# Patient Record
Sex: FEMALE | Race: BLACK OR AFRICAN AMERICAN | Employment: OTHER | RURAL
[De-identification: names, ages, dates, MRNs, and addresses within clinical notes are randomized per-mention and may not be internally consistent; named-entity substitution may affect disease eponyms.]

---

## 2017-03-06 ENCOUNTER — OFFICE VISIT (OUTPATIENT)
Dept: FAMILY MEDICINE CLINIC | Age: 82
End: 2017-03-06

## 2017-03-06 VITALS
TEMPERATURE: 96.4 F | OXYGEN SATURATION: 98 % | HEIGHT: 65 IN | WEIGHT: 174 LBS | HEART RATE: 72 BPM | SYSTOLIC BLOOD PRESSURE: 159 MMHG | DIASTOLIC BLOOD PRESSURE: 69 MMHG | BODY MASS INDEX: 28.99 KG/M2 | RESPIRATION RATE: 20 BRPM

## 2017-03-06 DIAGNOSIS — Z13.39 SCREENING FOR ALCOHOLISM: ICD-10-CM

## 2017-03-06 DIAGNOSIS — I10 ESSENTIAL HYPERTENSION: ICD-10-CM

## 2017-03-06 DIAGNOSIS — M47.816 SPONDYLOSIS OF LUMBAR REGION WITHOUT MYELOPATHY OR RADICULOPATHY: ICD-10-CM

## 2017-03-06 DIAGNOSIS — Z00.00 ROUTINE GENERAL MEDICAL EXAMINATION AT A HEALTH CARE FACILITY: Primary | ICD-10-CM

## 2017-03-06 DIAGNOSIS — Z78.9 ADVANCE DIRECTIVE IN CHART: ICD-10-CM

## 2017-03-06 DIAGNOSIS — Z13.31 SCREENING FOR DEPRESSION: ICD-10-CM

## 2017-03-06 DIAGNOSIS — Z00.00 MEDICARE ANNUAL WELLNESS VISIT, INITIAL: ICD-10-CM

## 2017-03-06 RX ORDER — METHYLPREDNISOLONE ACETATE 40 MG/ML
40 INJECTION, SUSPENSION INTRA-ARTICULAR; INTRALESIONAL; INTRAMUSCULAR; SOFT TISSUE ONCE
Qty: 1 VIAL | Refills: 0
Start: 2017-03-06 | End: 2017-03-06

## 2017-03-06 NOTE — PROGRESS NOTES
Samantha Hartley is a 80 y.o. female presenting for/with: Annual Wellness Visit ()      HPI:    Samantha Hartley is a 80 y.o. female presenting for/with: Annual Wellness Visit ()    HPI:  Cataracts  Pt having L phaco/iol 11/9/16 at [facility]. Hx falls: Fall at home on Friday 8/12. Using walker regularly. No falls since. HTN  Blood pressures doing well since last visit. Management at last visit included con't current tx. Elvis well. Current regimen: angiotensin II receptor antagonist , b-blocker, and calcium channel blocker. Symptoms include none. Patient denies chest pain, palpitations, dyspnea, orthopnea, peripheral edema, headache. Lab review:   Lab Results   Component Value Date/Time    Sodium 139 10/12/2016 09:25 AM    Potassium 5.6 10/12/2016 09:25 AM    Chloride 102 10/12/2016 09:25 AM    CO2 24 10/12/2016 09:25 AM    Glucose 120 10/12/2016 09:25 AM    BUN 24 10/12/2016 09:25 AM    Creatinine 1.92 10/12/2016 09:25 AM    BUN/Creatinine ratio 13 10/12/2016 09:25 AM    GFR est AA 25 10/12/2016 09:25 AM    GFR est non-AA 22 10/12/2016 09:25 AM    Calcium 9.4 10/12/2016 09:25 AM     Lab Results   Component Value Date/Time    Microalb/Creat ratio (ug/mg creat.) 13.3 10/12/2016 09:24 AM    Microalbumin, urine 31.6 10/12/2016 09:24 AM     DJD. Pt has been well controlled with depomedrol 40mg Inj q3mo PRN. Last one 8/2016. Elvis well. Also takes APAP 2 tabs every day PRN, work well, elvis well.     ROS:  Constitutional: No fever, chills or weight loss  Respiratory: No cough, SOB   CV: No chest pain or Palpitations    Exam:  Visit Vitals    /69 (BP 1 Location: Left arm, BP Patient Position: Sitting)    Pulse 72    Temp 96.4 °F (35.8 °C) (Oral)    Resp 20    Ht 5' 4.5\" (1.638 m)    Wt 174 lb (78.9 kg)    SpO2 98%    BMI 29.41 kg/m2     Wt Readings from Last 3 Encounters:   03/06/17 174 lb (78.9 kg)   10/03/16 177 lb (80.3 kg)   08/22/16 183 lb (83 kg)     BP Readings from Last 3 Encounters: 03/06/17 159/69   10/03/16 110/70   08/22/16 122/67     Physical Examination: General appearance - alert, well appearing, and in no distress  Mental status - alert, oriented to person, place, and time  Eyes - pupils equal and reactive, extraocular eye movements intact  ENT - bilateral external ears and nose normal. Normal lips  Neck - supple, no significant adenopathy. Lymphatics - no palpable lymphadenopathy, no hepatosplenomegaly  Chest - clear to auscultation, no wheezes, rales or rhonchi, symmetric air entry  Heart - normal rate, regular rhythm, normal S1, S2, no murmurs, rubs, clicks or gallops  Extremities - peripheral pulses normal, no pedal edema, no clubbing or cyanosis. R shoulder chronic dislocation present, nttp. No ttp to wrists, elbows bilat. A/P:  HTN (hypertension) with CKD4  Worsening control, but only taking the coreg every day. Boost back to BID. Check BMP. DJD (degenerative joint disease) of lumbar spine, with weakness and fall hx  Did well with Depo medrol 40mg last visit. con't Rollator. Injection today. F/U 3mo.

## 2017-03-06 NOTE — PATIENT INSTRUCTIONS
Schedule of Personalized Health Plan  (Provide Copy to Patient)  The best way to stay healthy is to live a healthy lifestyle. A healthy lifestyle includes regular exercise, eating a well-balanced diet, keeping a healthy weight and not smoking. Regular physical exams and screening tests are another important way to take care of yourself. Preventive exams provided by health care providers can find health problems early when treatment works best and can keep you from getting certain diseases or illnesses. Preventive services include exams, lab tests, screenings, shots, monitoring and information to help you take care of your own health. All people over 65 should have a pneumonia shot. Pneumonia shots are usually only needed once in a lifetime unless your doctor decides differently. All people over 65 should have a yearly flu shot. People over 65 are at medium to high risk for Hepatitis B. Three shots are needed for complete protection. In addition to your physical exam, some screening tests are recommended:    Bone mass measurement (dexa scan) is recommended every two years  Diabetes Mellitus screening is recommended every year. Glaucoma is an eye disease caused by high pressure in the eye. An eye exam is recommended every year. Cardiovascular screening tests that check your cholesterol and other blood fat (lipid) levels are recommended every five years. Colorectal Cancer screening tests help to find pre-cancerous polyps (growths in the colon) so they can be removed before they turn into cancer. Tests ordered for screening depend on your personal and family history risk factors.     Screening for Breast Cancer is recommended yearly with a mammogram.    Screening for Cervical Cancer is recommended every two years (annually for certain risk factors, such as previous history of STD or abnormal PAP in past 7 years), with a Pelvic Exam with PAP    Here is a list of your current Health Maintenance items with a due date:  Health Maintenance   Topic Date Due    DTaP/Tdap/Td series (1 - Tdap) 07[birth year]02/1942    ZOSTER VACCINE AGE 60>  07/02/1981    GLAUCOMA SCREENING Q2Y  07/02/1986    OSTEOPOROSIS SCREENING (DEXA)  07/02/1986    Pneumococcal 65+ High/Highest Risk (1 of 2 - PCV13) 07/02/1986    INFLUENZA AGE 9 TO ADULT  08/01/2016    MEDICARE YEARLY EXAM  03/01/2017       If you have any questions regarding [employer], you may call [employer] support at [phone].

## 2017-03-06 NOTE — MR AVS SNAPSHOT
Visit Information Date & Time Provider Department Dept. Phone Encounter #  
 3/6/2017  9:10 AM Mar Smart MD Zakia Nichols [id number] Follow-up Instructions Return in about 2 weeks (around 3/20/2017), or if symptoms worsen or fail to improve, for prevnar and pressure check. César Andrew Upcoming Health Maintenance Date Due DTaP/Tdap/Td series (1 - Tdap) 7/2/1942 ZOSTER VACCINE AGE 60> 7/2/1981 GLAUCOMA SCREENING Q2Y 7/2/1986 OSTEOPOROSIS SCREENING (DEXA) 7/2/1986 Pneumococcal 65+ High/Highest Risk (1 of 2 - PCV13) 7/2/1986 INFLUENZA AGE 9 TO ADULT 8/1/2016 MEDICARE YEARLY EXAM 3/1/2017 Allergies as of 3/6/2017  Review Complete On: 3/6/2017 By: Mar Smart MD  
 No Known Allergies Current Immunizations  Never Reviewed No immunizations on file. Not reviewed this visit You Were Diagnosed With   
  
 Codes Comments Routine general medical examination at a health care facility    -  Primary ICD-10-CM: Z00.00 ICD-9-CM: V70.0 Advance directive in chart     ICD-10-CM: Z78.9 ICD-9-CM: V49.89 Medicare annual wellness visit, initial     ICD-10-CM: Z00.00 ICD-9-CM: V70.0 Screening for alcoholism     ICD-10-CM: Z13.89 ICD-9-CM: V79.1 Screening for depression     ICD-10-CM: Z13.89 ICD-9-CM: V79.0 Spondylosis of lumbar region without myelopathy or radiculopathy     ICD-10-CM: M47.816 ICD-9-CM: 721.3 Essential hypertension     ICD-10-CM: I10 
ICD-9-CM: 401.9 Vitals BP Pulse Temp Resp Height(growth percentile) Weight(growth percentile) 159/69 (BP 1 Location: Left arm, BP Patient Position: Sitting) 72 96.4 °F (35.8 °C) (Oral) 20 5' 4.5\" (1.638 m) 174 lb (78.9 kg) SpO2 BMI OB Status Smoking Status 98% 29.41 kg/m2 Postmenopausal Never Smoker BMI and BSA Data Body Mass Index Body Surface Area  
 29.41 kg/m 2 1.89 m 2 Preferred Pharmacy Pharmacy Name Phone [address] [facility] AT [facility] OF [address] [place] [phone] Your Updated Medication List  
  
   
This list is accurate as of: 3/6/17 10:04 AM.  Always use your most recent med list. amLODIPine-valsartan  mg per tablet Commonly known as:  Arch Pheasant Take 0.5-1 Tabs by mouth daily. Indications: pressure  
  
 carvedilol 6.25 mg tablet Commonly known as:  Cleatis Vince Take 1 Tab by mouth two (2) times daily (with meals). Indications: pressure and kidney  
  
 methylPREDNISolone acetate 40 mg/mL injection Commonly known as:  DEPO-MEDROL 1 mL by IntraMUSCular route once for 1 dose. TYLENOL 325 mg tablet Generic drug:  acetaminophen Take 325 mg by mouth two (2) times a day. VITAMIN D3 2,000 unit Tab Generic drug:  cholecalciferol (vitamin D3) Take  by mouth. Pinkie Skiff Commonly known as:  ULTRA-LIGHT ROLLATOR  
1 Each by Does Not Apply route daily. We Performed the Following COLLECTION VENOUS BLOOD,VENIPUNCTURE [id number] CPT(R)] [facility] 68 [[id number] [facility] the [place]] METABOLIC PANEL, BASIC [62490 CPT(R)] METHYLPREDNISOLONE ACETATE INJECTION 40 MG [ HCP] [place] THER/PROPH/DIAG INJECTION, SUBCUT/IM [id number] CPT(R)] Follow-up Instructions Return in about 2 weeks (around 3/20/2017), or if symptoms worsen or fail to improve, for prevnar and pressure check. Emilia Pearl Patient Instructions Schedule of Personalized Health Plan (Provide Copy to Patient) The best way to stay healthy is to live a healthy lifestyle. A healthy lifestyle includes regular exercise, eating a well-balanced diet, keeping a healthy weight and not smoking. Regular physical exams and screening tests are another important way to take care of yourself.  Preventive exams provided by health care providers can find health problems early when treatment works best and can keep you from getting certain diseases or illnesses. Preventive services include exams, lab tests, screenings, shots, monitoring and information to help you take care of your own health. All people over 65 should have a pneumonia shot. Pneumonia shots are usually only needed once in a lifetime unless your doctor decides differently. All people over 65 should have a yearly flu shot. People over 65 are at medium to high risk for Hepatitis B. Three shots are needed for complete protection. In addition to your physical exam, some screening tests are recommended: 
 
Bone mass measurement (dexa scan) is recommended every two years Diabetes Mellitus screening is recommended every year. Glaucoma is an eye disease caused by high pressure in the eye. An eye exam is recommended every year. Cardiovascular screening tests that check your cholesterol and other blood fat (lipid) levels are recommended every five years. Colorectal Cancer screening tests help to find pre-cancerous polyps (growths in the colon) so they can be removed before they turn into cancer. Tests ordered for screening depend on your personal and family history risk factors. Screening for Breast Cancer is recommended yearly with a mammogram. 
 
Screening for Cervical Cancer is recommended every two years (annually for certain risk factors, such as previous history of STD or abnormal PAP in past 7 years), with a Pelvic Exam with PAP Here is a list of your current Health Maintenance items with a due date: 
Health Maintenance Topic Date Due  
 DTaP/Tdap/Td series (1 - Tdap) 07/02/1942  ZOSTER VACCINE AGE 60>  07/02/1981  GLAUCOMA SCREENING Q2Y  07/02/1986  
 OSTEOPOROSIS SCREENING (DEXA)  07/02/1986  Pneumococcal 65+ High/Highest Risk (1 of 2 - PCV13) 07/02/1986  INFLUENZA AGE 9 TO ADULT  08/01/2016  MEDICARE YEARLY EXAM  03/01/2017 If you have any questions regarding [employer], you may call [employer] support at [phone]. Introducing [facility] & HEALTH SERVICES! [facility] introduces [employer] patient portal. Now you can access parts of your medical record, email your doctor's office, and request medication refills online. 1. In your internet browser, go to [email or web address] 2. Click on the First Time User? Click Here link in the Sign In box. You will see the New Member Sign Up page. 3. Enter your K[employer]ers Access Code exactly as it appears below. You will not need to use this code after youve completed the sign-up process. If you do not sign up before the expiration date, you must request a new code. · [employer] Access Code: [email or web address] Expires: 6/4/2017  8:29 AM 
 
4. Enter the last four digits of your Social Security Number (xxxx) and Date of Birth (mm/dd/yyyy) as indicated and click Submit. You will be taken to the next sign-up page. 5. Create a K[employer]Readers ID. This will be your K[employer]Readers login ID and cannot be changed, so think of one that is secure and easy to remember. 6. Create a K[employer]Readers password. You can change your password at any time. 7. Enter your Password Reset Question and Answer. This can be used at a later time if you forget your password. 8. Enter your e-mail address. You will receive e-mail notification when new information is available in [address] 9. Click Sign Up. You can now view and download portions of your medical record. 10. Click the Download Summary menu link to download a portable copy of your medical information. If you have questions, please visit the Frequently Asked Questions section of the [employer] website. Remember, [employer]ers is NOT to be used for urgent needs. For medical emergencies, dial 911. Now available from your iPhone and Android! Please provide this summary of care documentation to your next provider. Your primary care clinician is listed as Jordan Haro.  If you have any questions after today's visit, please call [phone].

## 2017-03-06 NOTE — PROGRESS NOTES
Jayne Saint is a 80 y.o. female and presents for annual Medicare Wellness Visit. Problem List: Reviewed with patient and discussed risk factors. Patient Active Problem List   Diagnosis Code    HTN (hypertension) I10    CKD (chronic kidney disease) stage 4, GFR 15-29 ml/min ([facility]) N18.4    Lumbar spondylosis M47.816       Current medical providers:  Patient Care Team:  Karen aMta MD as PCP - General (Family Practice)    PSH: Reviewed with patient  Past Surgical History:   Procedure Laterality Date    HX HYSTERECTOMY      HX KNEE REPLACEMENT      bilateral        SH: Reviewed with patient  Social History   Substance Use Topics    Smoking status: Never Smoker    Smokeless tobacco: Never Used    Alcohol use No       FH: Reviewed with patient  Family History   Problem Relation Age of Onset    Heart Disease Father     Stroke Father        Medications/Allergies: Reviewed with patient  Current Outpatient Prescriptions on File Prior to Visit   Medication Sig Dispense Refill    amLODIPine-valsartan (EXFORGE)  mg per tablet Take 0.5-1 Tabs by mouth daily. Indications: pressure 30 Tab 5    carvedilol (COREG) 6.25 mg tablet Take 1 Tab by mouth two (2) times daily (with meals). Indications: pressure and kidney 60 Tab 11    Walker (ULTRA-LIGHT ROLLATOR) misc 1 Each by Does Not Apply route daily. 1 Each 0    cholecalciferol, vitamin D3, (VITAMIN D3) 2,000 unit tab Take  by mouth.  acetaminophen (TYLENOL) 325 mg tablet Take 325 mg by mouth two (2) times a day. No current facility-administered medications on file prior to visit. No Known Allergies    Objective:  Visit Vitals    /69 (BP 1 Location: Left arm, BP Patient Position: Sitting)    Pulse 72    Temp 96.4 °F (35.8 °C) (Oral)    Resp 20    Ht 5' 4.5\" (1.638 m)    Wt 174 lb (78.9 kg)    SpO2 98%    BMI 29.41 kg/m2    Body mass index is 29.41 kg/(m^2).     Assessment of cognitive impairment: Alert and oriented x 3    Depression Screen:   PHQ 2 / 9, over the last two weeks 3/6/2017   Little interest or pleasure in doing things Not at all   Feeling down, depressed or hopeless Not at all   Total Score PHQ 2 0       Fall Risk Assessment:    Fall Risk Assessment, last 12 mths 3/6/2017   Able to walk? Yes   Fall in past 12 months? Yes   Fall with injury? No   Number of falls in past 12 months 1   Fall Risk Score 1     Functional Ability:   Does the patient exhibit a steady gait?  no   How long did it take the patient to get up and walk from a sitting position? 10 seconds   Is the patient self reliant?  (ie can do own laundry, meals, household chores)  yes     Does the patient handle his/her own medications? yes     Does the patient handle his/her own money? yes     Is the patients home safe (ie good lighting, handrails on stairs and bath, etc.)? yes     Did you notice or did patient express any hearing difficulties? no     Did you notice or did patient express any vision difficulties?   no     Were distance and reading eye charts used?   no       Advance Care Planning:   Patient was offered the opportunity to discuss advance care planning:  yes     Does patient have an Advance Directive:  yes   If no, did you provide information on Caring Connections?  no       Plan:    Prevnar in 2 weeks    Orders Placed This Encounter    Depression Screen Annual    COLLECTION VENOUS BLOOD,VENIPUNCTURE    METABOLIC PANEL, BASIC    METHYLPREDNISOLONE ACETATE INJECTION 40 MG    [place] THER/PROPH/DIAG INJECTION, SUBCUT/IM    methylPREDNISolone acetate (DEPO-MEDROL) 40 mg/mL injection       Health Maintenance   Topic Date Due    DTaP/Tdap/Td series (1 - Tdap) 07/02/1942    ZOSTER VACCINE AGE 60>  07/02/1981    GLAUCOMA SCREENING Q2Y  07/02/1986    OSTEOPOROSIS SCREENING (DEXA)  07/02/1986    Pneumococcal 65+ High/Highest Risk (1 of 2 - PCV13) 07/02/1986    INFLUENZA AGE 9 TO ADULT  08/01/2016    MEDICARE YEARLY EXAM  03/01/2017 *Patient verbalized understanding and agreement with the plan. A copy of the After Visit Summary with personalized health plan was given to the patient today.

## 2017-03-07 LAB
BUN SERPL-MCNC: 19 MG/DL (ref 10–36)
BUN/CREAT SERPL: 14 (ref 11–26)
CALCIUM SERPL-MCNC: 9 MG/DL (ref 8.7–10.3)
CHLORIDE SERPL-SCNC: 102 MMOL/L (ref 96–106)
CO2 SERPL-SCNC: 22 MMOL/L (ref 18–29)
CREAT SERPL-MCNC: 1.38 MG/DL (ref 0.57–1)
GLUCOSE SERPL-MCNC: 108 MG/DL (ref 65–99)
POTASSIUM SERPL-SCNC: 5 MMOL/L (ref 3.5–5.2)
SODIUM SERPL-SCNC: 142 MMOL/L (ref 134–144)

## 2017-03-24 ENCOUNTER — OFFICE VISIT (OUTPATIENT)
Dept: FAMILY MEDICINE CLINIC | Age: 82
End: 2017-03-24

## 2017-03-24 VITALS
RESPIRATION RATE: 20 BRPM | TEMPERATURE: 96.4 F | BODY MASS INDEX: 27.99 KG/M2 | SYSTOLIC BLOOD PRESSURE: 161 MMHG | OXYGEN SATURATION: 96 % | WEIGHT: 168 LBS | DIASTOLIC BLOOD PRESSURE: 72 MMHG | HEIGHT: 65 IN | HEART RATE: 76 BPM

## 2017-03-24 DIAGNOSIS — I10 ESSENTIAL HYPERTENSION: Primary | ICD-10-CM

## 2017-03-24 DIAGNOSIS — N18.4 CKD (CHRONIC KIDNEY DISEASE) STAGE 4, GFR 15-29 ML/MIN (HCC): ICD-10-CM

## 2017-03-24 RX ORDER — BISOPROLOL FUMARATE AND HYDROCHLOROTHIAZIDE 5; 6.25 MG/1; MG/1
1 TABLET ORAL DAILY
Qty: 30 TAB | Refills: 11 | Status: SHIPPED | OUTPATIENT
Start: 2017-03-24 | End: 2017-05-26 | Stop reason: SDUPTHER

## 2017-03-24 NOTE — MR AVS SNAPSHOT
Visit Information Date & Time Provider Department Dept. Phone Encounter #  
 3/24/2017  9:30 AM Vel August MD [address] [place] [address] [id number] Follow-up Instructions Return in about 2 months (around 5/24/2017). Follow-up and Disposition History Upcoming Health Maintenance Date Due DTaP/Tdap/Td series (1 - Tdap) 7[birth year]2/1942 ZOSTER VACCINE AGE 60> 7/2/1981 GLAUCOMA SCREENING Q2Y 7/2/1986 OSTEOPOROSIS SCREENING (DEXA) 7/2/1986 Pneumococcal 65+ High/Highest Risk (1 of 2 - PCV13) 7/2/1986 INFLUENZA AGE 9 TO ADULT 8/1/2016 MEDICARE YEARLY EXAM 3/7/2018 Allergies as of 3/24/2017  Review Complete On: 3/24/2017 By: Vel August MD  
 No Known Allergies Current Immunizations  Never Reviewed No immunizations on file. Not reviewed this visit You Were Diagnosed With   
  
 Codes Comments Essential hypertension    -  Primary ICD-10-CM: I10 
ICD-9-CM: 401.9 CKD (chronic kidney disease) stage 4, GFR 15-29 ml/min ([facility])     ICD-10-CM: N18.4 ICD-9-CM: 676. 4 Vitals BP Pulse Temp Resp Height(growth percentile) Weight(growth percentile) 161/72 (BP 1 Location: Right arm, BP Patient Position: Sitting) 76 96.4 °F (35.8 °C) (Oral) 20 5' 4.5\" (1.638 m) 168 lb (76.2 kg) SpO2 BMI OB Status Smoking Status 96% 28.39 kg/m2 Postmenopausal Never Smoker BMI and BSA Data Body Mass Index Body Surface Area  
 28.39 kg/m 2 1.86 m 2 Preferred Pharmacy Pharmacy Name Phone [address], [address] AT [facility] OF  [address] & [facility] [facility] [phone] Your Updated Medication List  
  
   
This list is accurate as of: 3/24/17 10:28 AM.  Always use your most recent med list. amLODIPine-valsartan  mg per tablet Commonly known as:  Annmarie Hoe Take 0.5-1 Tabs by mouth daily. Indications: pressure bisoprolol-hydroCHLOROthiazide 5-6.25 mg per tablet Commonly known as:  [facility] Take 1 Tab by mouth daily. Indications: pressure, replaces carvedilol TYLENOL 325 mg tablet Generic drug:  acetaminophen Take 325 mg by mouth two (2) times a day. VITAMIN D3 2,000 unit Tab Generic drug:  cholecalciferol (vitamin D3) Take  by mouth. Maximiano Reddish Commonly known as:  ULTRA-LIGHT ROLLATOR  
1 Each by Does Not Apply route daily. Prescriptions Sent to Pharmacy Refills  
 bisoprolol-hydroCHLOROthiazide (ZIAC) 5-6.25 mg per tablet 11 Sig: Take 1 Tab by mouth daily. Indications: pressure, replaces carvedilol Class: Normal  
 Pharmacy: [facility] [place] [address], [address] [place] [address] [address] Ph #: [phone] Route: Oral  
  
Follow-up Instructions Return in about 2 months (around 5/24/2017). Patient Instructions If you have any questions regarding [employer], you may call [employer] support at [phone]. Introducing \[facility]\"" & HEALTH SERVICES! Anne Ledezma introduces [employer] patient portal. Now you can access parts of your medical record, email your doctor's office, and request medication refills online. 1. In your internet browser, go to [email or web address] 2. Click on the First Time User? Click Here link in the Sign In box. You will see the New Member Sign Up page. 3. Enter your [employer] Access Code exactly as it appears below. You will not need to use this code after youve completed the sign-up process. If you do not sign up before the expiration date, you must request a new code. · [employer] Access Code: [email or web address] Expires: 6/4/2017  9:29 AM 
 
4. Enter the last four digits of your Social Security Number (xxxx) and Date of Birth (mm/dd/yyyy) as indicated and click Submit. You will be taken to the next sign-up page. 5. Create a [employer] ID. This will be your [employer] login ID and cannot be changed, so think of one that is secure and easy to remember. 6. Create a [employer] password. You can change your password at any time. 7. Enter your Password Reset Question and Answer. This can be used at a later time if you forget your password. 8. Enter your e-mail address. You will receive e-mail notification when new information is available in [address] 9. Click Sign Up. You can now view and download portions of your medical record. 10. Click the Download Summary menu link to download a portable copy of your medical information. If you have questions, please visit the Frequently Asked Questions section of the [employer] website. Remember, [employer] is NOT to be used for urgent needs. For medical emergencies, dial 911. Now available from your iPhone and Android! Please provide this summary of care documentation to your next provider. Your primary care clinician is listed as Ernestina Haro. If you have any questions after today's visit, please call [phone].

## 2017-03-24 NOTE — PROGRESS NOTES
Barry Mayorga is a 80 y.o. female presenting for/with:    Hypertension and Immunization/Injection    HPI:    HTN  Blood pressures up persistently since last visit. Management at last visit included boosting coreg to 6.25mg BID. Elvis well. Current regimen: angiotensin II receptor antagonist , b-blocker, and calcium channel blocker. Symptoms include none. Patient denies chest pain, palpitations, dyspnea, orthopnea, peripheral edema, headache. Lab review:   Lab Results   Component Value Date/Time    Sodium 142 03/06/2017 09:53 AM    Potassium 5.0 03/06/2017 09:53 AM    Chloride 102 03/06/2017 09:53 AM    CO2 22 03/06/2017 09:53 AM    Glucose 108 03/06/2017 09:53 AM    BUN 19 03/06/2017 09:53 AM    Creatinine 1.38 03/06/2017 09:53 AM    BUN/Creatinine ratio 14 03/06/2017 09:53 AM    GFR est AA 37 03/06/2017 09:53 AM    GFR est non-AA 33 03/06/2017 09:53 AM    Calcium 9.0 03/06/2017 09:53 AM     Lab Results   Component Value Date/Time    Microalb/Creat ratio (ug/mg creat.) 13.3 10/12/2016 09:24 AM    Microalbumin, urine 31.6 10/12/2016 09:24 AM     DJD. Pt has been well controlled with depomedrol 40mg Inj q3mo PRN. Last one 8/2016. Elvis well. Also takes APAP 2 tabs every day PRN, work well, elvis well.     ROS:  Constitutional: No fever, chills or weight loss  Respiratory: No cough, SOB   CV: No chest pain or Palpitations    Exam:  Visit Vitals    /72 (BP 1 Location: Right arm, BP Patient Position: Sitting)    Pulse 76    Temp 96.4 °F (35.8 °C) (Oral)    Resp 20    Ht 5' 4.5\" (1.638 m)    Wt 168 lb (76.2 kg)    SpO2 96%    BMI 28.39 kg/m2     Wt Readings from Last 3 Encounters:   03/24/17 168 lb (76.2 kg)   03/06/17 174 lb (78.9 kg)   10/03/16 177 lb (80.3 kg)     BP Readings from Last 3 Encounters:   03/24/17 161/72   03/06/17 159/69   10/03/16 110/70     Physical Examination: General appearance - alert, well appearing, and in no distress  Mental status - alert, oriented to person, place, and time  Eyes - pupils equal and reactive, extraocular eye movements intact  ENT - bilateral external ears and nose normal. Normal lips  Neck - supple, no significant adenopathy. Extremities - peripheral pulses normal, no pedal edema, no clubbing or cyanosis. R shoulder chronic dislocation present, nttp. No ttp to wrists, elbows bilat. A/P:  HTN (hypertension) with CKD4  Still in inadequate control. GFR better lately. Change coreg to ziac 5/6.25 1 qam to simplify regimen. F/U 3mo.

## 2017-05-26 ENCOUNTER — OFFICE VISIT (OUTPATIENT)
Dept: FAMILY MEDICINE CLINIC | Age: 82
End: 2017-05-26

## 2017-05-26 VITALS
TEMPERATURE: 97.8 F | HEIGHT: 65 IN | OXYGEN SATURATION: 97 % | BODY MASS INDEX: 27.66 KG/M2 | DIASTOLIC BLOOD PRESSURE: 71 MMHG | HEART RATE: 71 BPM | WEIGHT: 166 LBS | SYSTOLIC BLOOD PRESSURE: 154 MMHG | RESPIRATION RATE: 16 BRPM

## 2017-05-26 DIAGNOSIS — M19.042 PRIMARY OSTEOARTHRITIS OF BOTH HANDS: ICD-10-CM

## 2017-05-26 DIAGNOSIS — I10 ESSENTIAL HYPERTENSION: Primary | ICD-10-CM

## 2017-05-26 DIAGNOSIS — N18.4 CKD (CHRONIC KIDNEY DISEASE) STAGE 4, GFR 15-29 ML/MIN (HCC): ICD-10-CM

## 2017-05-26 DIAGNOSIS — M19.041 PRIMARY OSTEOARTHRITIS OF BOTH HANDS: ICD-10-CM

## 2017-05-26 RX ORDER — METHYLPREDNISOLONE ACETATE 40 MG/ML
40 INJECTION, SUSPENSION INTRA-ARTICULAR; INTRALESIONAL; INTRAMUSCULAR; SOFT TISSUE ONCE
Qty: 1 VIAL | Refills: 0
Start: 2017-05-26 | End: 2017-05-26

## 2017-05-26 RX ORDER — BISOPROLOL FUMARATE AND HYDROCHLOROTHIAZIDE 5; 6.25 MG/1; MG/1
1 TABLET ORAL DAILY
Qty: 90 TAB | Refills: 3 | Status: SHIPPED | OUTPATIENT
Start: 2017-05-26 | End: 2018-02-28

## 2017-05-26 RX ORDER — AMLODIPINE AND VALSARTAN 10; 320 MG/1; MG/1
1 TABLET ORAL DAILY
Qty: 90 TAB | Refills: 3 | Status: SHIPPED | OUTPATIENT
Start: 2017-05-26 | End: 2018-05-23 | Stop reason: SDUPTHER

## 2017-05-26 RX ORDER — METHYLPREDNISOLONE ACETATE 40 MG/ML
40 INJECTION, SUSPENSION INTRA-ARTICULAR; INTRALESIONAL; INTRAMUSCULAR; SOFT TISSUE ONCE
Qty: 1 VIAL | Refills: 0
Start: 2017-05-26 | End: 2017-05-26 | Stop reason: CLARIF

## 2017-05-26 RX ORDER — CARVEDILOL 6.25 MG/1
TABLET ORAL
Refills: 11 | COMMUNITY
Start: 2017-05-18 | End: 2017-05-26 | Stop reason: ALTCHOICE

## 2017-05-26 NOTE — PROGRESS NOTES
Kenzie Richard is a 80 y.o. female presenting for/with:    Hypertension    HPI:    HTN  Blood pressures improving since last visit. Management at last visit included changing coreg to ziac 5/6.25 every day Elvis well. Current regimen: angiotensin II receptor antagonist , b-blocker, thiazide, and calcium channel blocker. Symptoms include none. Patient denies chest pain, palpitations, dyspnea, orthopnea, peripheral edema, headache. Lab review:   Lab Results   Component Value Date/Time    Sodium 142 03/06/2017 09:53 AM    Potassium 5.0 03/06/2017 09:53 AM    Chloride 102 03/06/2017 09:53 AM    CO2 22 03/06/2017 09:53 AM    Glucose 108 03/06/2017 09:53 AM    BUN 19 03/06/2017 09:53 AM    Creatinine 1.38 03/06/2017 09:53 AM    BUN/Creatinine ratio 14 03/06/2017 09:53 AM    GFR est AA 37 03/06/2017 09:53 AM    GFR est non-AA 33 03/06/2017 09:53 AM    Calcium 9.0 03/06/2017 09:53 AM     Lab Results   Component Value Date/Time    Microalb/Creat ratio (ug/mg creat.) 13.3 10/12/2016 09:24 AM    Microalbumin, urine 31.6 10/12/2016 09:24 AM     DJD. Pt has been well controlled with depomedrol 40mg Inj q3mo PRN. Last one 3/2017. Elvis well. Also takes APAP 2 tabs every day PRN, work well, elvis well.     ROS:  Constitutional: No fever, chills or weight loss  Respiratory: No cough, SOB   CV: No chest pain or Palpitations    Exam:  Visit Vitals    /71 (BP 1 Location: Left arm, BP Patient Position: Sitting)    Pulse 71    Temp 97.8 °F (36.6 °C) (Oral)    Resp 16    Ht 5' 4.5\" (1.638 m)    Wt 166 lb (75.3 kg)    SpO2 97%    BMI 28.05 kg/m2     Wt Readings from Last 3 Encounters:   05/26/17 166 lb (75.3 kg)   03/24/17 168 lb (76.2 kg)   03/06/17 174 lb (78.9 kg)     BP Readings from Last 3 Encounters:   05/26/17 154/71   03/24/17 161/72   03/06/17 159/69     Physical Examination: General appearance - alert, well appearing, and in no distress  Mental status - alert, oriented to person, place, and time  Eyes - pupils equal and reactive, extraocular eye movements intact  ENT - bilateral external ears and nose normal. Normal lips  Neck - supple, no significant adenopathy. Extremities - peripheral pulses normal, no pedal edema, no clubbing or cyanosis. R shoulder chronic dislocation present, nttp. No ttp to wrists, elbows bilat. A/P:  HTN (hypertension) with CKD4  In good control. con't ziac 5/6.25 1 qam and exforge. Plan check BMP next visit. DJD  Time for CS injection. Sx worsening. Give depomedrol 40mg Inj today. F/U 3mo.

## 2017-05-26 NOTE — MR AVS SNAPSHOT
Visit Information Date & Time Provider Department Dept. Phone Encounter #  
 5/26/2017  3:20 PM Fredy Quintero, Zakia Nichols [id number] Follow-up Instructions Return in about 3 months (around 8/26/2017). Your Appointments 3/12/2018 10:30 AM  
ESTABLISHED PATIENT with Fredy Quintero MD  
Rey Garvey ([facility]) Appt Note: WELLNESS   
 [address] [facility] [address] [facility],5Th Floor [address] [phone]  
  
   
 [address] [facility] [address] [facility],5[address] [address] Upcoming Health Maintenance Date Due DTaP/Tdap/Td series (1 - Tdap) 7[birth year]2/1942 ZOSTER VACCINE AGE 60> 7/2/1981 GLAUCOMA SCREENING Q2Y 7/2/1986 OSTEOPOROSIS SCREENING (DEXA) 7/2/1986 Pneumococcal 65+ Low/Medium Risk (1 of 2 - PCV13) 7/2/1986 INFLUENZA AGE 9 TO ADULT 8/1/2017 MEDICARE YEARLY EXAM 3/7/2018 Allergies as of 5/26/2017  Review Complete On: 5/26/2017 By: Fredy Quintero MD  
 No Known Allergies Current Immunizations  Never Reviewed No immunizations on file. Not reviewed this visit You Were Diagnosed With   
  
 Codes Comments Essential hypertension    -  Primary ICD-10-CM: I10 
ICD-9-CM: 401.9 Primary osteoarthritis of both hands     ICD-10-CM: M19.041, L60.568 ICD-9-CM: 715.14 CKD (chronic kidney disease) stage 4, GFR 15-29 ml/min ([facility])     ICD-10-CM: N18.4 ICD-9-CM: 645. 4 Vitals BP Pulse Temp Resp Height(growth percentile) Weight(growth percentile) 154/71 (BP 1 Location: Left arm, BP Patient Position: Sitting) 71 97.8 °F (36.6 °C) (Oral) 16 5' 4.5\" (1.638 m) 166 lb (75.3 kg) SpO2 BMI OB Status Smoking Status 97% 28.05 kg/m2 Postmenopausal Never Smoker Vitals History BMI and BSA Data Body Mass Index Body Surface Area 28.05 kg/m 2 1.85 m 2 Preferred Pharmacy Pharmacy Name Phone [address], [address] [facility] AT [facility] OF [address] [facility]. Katie Ortega [phone] Your Updated Medication List  
  
   
This list is accurate as of: 5/26/17  4:50 PM.  Always use your most recent med list. amLODIPine-valsartan  mg per tablet Commonly known as:  Arthur Mean Take 1 Tab by mouth daily. Indications: pressure  
  
 bisoprolol-hydroCHLOROthiazide 5-6.25 mg per tablet Commonly known as:  [facility] Take 1 Tab by mouth daily. Indications: pressure, replaces carvedilol  
  
 methylPREDNISolone acetate 40 mg/mL injection Commonly known as:  DEPO-MEDROL 1 mL by IntraMUSCular route once for 1 dose. TYLENOL 325 mg tablet Generic drug:  acetaminophen Take 325 mg by mouth two (2) times a day. VITAMIN D3 2,000 unit Tab Generic drug:  cholecalciferol (vitamin D3) Take  by mouth. [place] Anthonymarcellus Commonly known as:  ULTRA-LIGHT ROLLATOR  
1 Each by Does Not Apply route daily. Prescriptions Sent to Pharmacy Refills  
 amLODIPine-valsartan (EXFORGE)  mg per tablet 3 Sig: Take 1 Tab by mouth daily. Indications: pressure Class: Normal  
 Pharmacy: [facility] [place] [address], [address] [place] [address] [address]  Ph #: [phone] Route: Oral  
 bisoprolol-hydroCHLOROthiazide (ZIAC) 5-6.25 mg per tablet 3 Sig: Take 1 Tab by mouth daily. Indications: pressure, replaces carvedilol Class: Normal  
 Pharmacy: [facility] [place] [address], [address] [place] [address] [address]  Ph #: [phone] Route: Oral  
  
We Performed the Following METHYLPREDNISOLONE ACETATE INJECTION 40 MG [ [facility]] [place] THER/PROPH/DIAG INJECTION, SUBCUT/IM [id number] CPT(R)] [place] THER/PROPH/DIAG INJECTION, SUBCUT/IM [id number] CPT(R)] Follow-up Instructions Return in about 3 months (around 8/26/2017). [facility]! Ramandeep mendez [employer]ryng[employer] patient portal. Now you can access parts of your medical record, email your doctor's office, and request medication refills online. 1. In your internet browser, go to [email or web address] 2. Click on the First Time User? Click Here link in the Sign In box. You will see the New Member Sign Up page. 3. Enter your Arynga Access Code exactly as it appears below. You will not need to use this code after youve completed the sign-up process. If you do not sign up before the expiration date, you must request a new code. · [employer]ng[employer] Access Code: [email or web address] Expires: 6/4/2017  9:29 AM 
 
4. Enter the last four digits of your Social Security Number (xxxx) and Date of Birth (mm/dd/yyyy) as indicated and click Submit. You will be taken to the next sign-up page. 5. Create a Arynga ID. This will be your Arynga login ID and cannot be changed, so think of one that is secure and easy to remember. 6. Create a Arynga password. You can change your password at any time. 7. Enter your Password Reset Question and Answer. This can be used at a later time if you forget your password. 8. Enter your e-mail address. You will receive e-mail notification when new information is available in [address] 9. Click Sign Up. You can now view and download portions of your medical record. 10. Click the Download Summary menu link to download a portable copy of your medical information. If you have questions, please visit the Frequently Asked Questions section of the [employer]ng[employer] website. Remember, Arynga is NOT to be used for urgent needs. For medical emergencies, dial 911. Now available from your iPhone and Android! Please provide this summary of care documentation to your next provider. Your primary care clinician is listed as Netta Haro. If you have any questions after today's visit, please call [phone].

## 2017-11-29 ENCOUNTER — OFFICE VISIT (OUTPATIENT)
Dept: FAMILY MEDICINE CLINIC | Age: 82
End: 2017-11-29

## 2017-11-29 VITALS
TEMPERATURE: 98.5 F | WEIGHT: 164 LBS | HEIGHT: 64 IN | OXYGEN SATURATION: 95 % | BODY MASS INDEX: 28 KG/M2 | SYSTOLIC BLOOD PRESSURE: 173 MMHG | HEART RATE: 60 BPM | DIASTOLIC BLOOD PRESSURE: 69 MMHG

## 2017-11-29 DIAGNOSIS — N18.30 STAGE 3 CHRONIC KIDNEY DISEASE (HCC): ICD-10-CM

## 2017-11-29 DIAGNOSIS — Z78.0 POSTMENOPAUSAL: ICD-10-CM

## 2017-11-29 DIAGNOSIS — I10 ESSENTIAL HYPERTENSION: Primary | ICD-10-CM

## 2017-11-29 DIAGNOSIS — M47.816 LUMBAR SPONDYLOSIS: ICD-10-CM

## 2017-11-29 RX ORDER — SPIRONOLACTONE 25 MG/1
12.5 TABLET ORAL DAILY
Qty: 30 TAB | Refills: 6 | Status: SHIPPED | OUTPATIENT
Start: 2017-11-29 | End: 2017-12-29 | Stop reason: SDUPTHER

## 2017-11-29 RX ORDER — METHYLPREDNISOLONE ACETATE 40 MG/ML
40 INJECTION, SUSPENSION INTRA-ARTICULAR; INTRALESIONAL; INTRAMUSCULAR; SOFT TISSUE ONCE
Qty: 1 VIAL | Refills: 0
Start: 2017-11-29 | End: 2017-11-29

## 2017-11-29 NOTE — PROGRESS NOTES
Sergio Roland is a 80 y.o. female presenting for/with:    Hypertension (CHECK UP)    HPI:    HTN  Blood pressures a little worse since last visit. Management at last visit included con't ziac 5/6.25 every day and exforge 10/320. Elvis well. Current regimen: angiotensin II receptor antagonist , b-blocker, thiazide, and calcium channel blocker. Symptoms include none. Patient denies chest pain, palpitations, dyspnea, orthopnea, peripheral edema, headache. Lab review:   Lab Results   Component Value Date/Time    Sodium 142 03/06/2017 09:53 AM    Potassium 5.0 03/06/2017 09:53 AM    Chloride 102 03/06/2017 09:53 AM    CO2 22 03/06/2017 09:53 AM    Glucose 108 03/06/2017 09:53 AM    BUN 19 03/06/2017 09:53 AM    Creatinine 1.38 03/06/2017 09:53 AM    BUN/Creatinine ratio 14 03/06/2017 09:53 AM    GFR est AA 37 03/06/2017 09:53 AM    GFR est non-AA 33 03/06/2017 09:53 AM    Calcium 9.0 03/06/2017 09:53 AM     Lab Results   Component Value Date/Time    Microalb/Creat ratio (ug/mg creat.) 13.3 10/12/2016 09:24 AM     DJD. Pt has been well controlled with depomedrol 40mg Inj q3mo PRN. Last one 5/2017. Elvis well. Also takes APAP 2 tabs every day PRN, work well, elvis well.     ROS:  Constitutional: No fever, chills or weight loss  Respiratory: No cough, SOB   CV: No chest pain or Palpitations    Exam:  Visit Vitals    /69    Pulse 60    Temp 98.5 °F (36.9 °C) (Temporal)    Ht 5' 4\" (1.626 m)    Wt 164 lb (74.4 kg)    SpO2 95%    BMI 28.15 kg/m2     Wt Readings from Last 3 Encounters:   11/29/17 164 lb (74.4 kg)   05/26/17 166 lb (75.3 kg)   03/24/17 168 lb (76.2 kg)   -2#  BP Readings from Last 3 Encounters:   11/29/17 173/69   05/26/17 154/71   03/24/17 161/72     Physical Examination: General appearance - alert, well appearing, and in no distress  Mental status - alert, oriented to person, place, and time  Eyes - pupils equal and reactive, extraocular eye movements intact  ENT - bilateral external ears and nose normal. Normal lips  Neck - supple, no significant adenopathy. Extremities - peripheral pulses normal, no pedal edema, no clubbing or cyanosis. R shoulder chronic dislocation present, nttp. No ttp to wrists, elbows bilat. A/P:  HTN (hypertension) with CKD4  Up again. Not In goal. Try add aldactone 12.5 every day. Check BMP. DJD  Time for CS injection. Sx worsening. Give depomedrol 40mg Inj today. DXA due. Ordered. F/U 3mo.

## 2017-11-29 NOTE — MR AVS SNAPSHOT
Visit Information Date & Time Provider Department Dept. Phone Encounter #  
 11/29/2017  9:50 AM Corinne Bugler, MD [address] [place] [address] [id number] Follow-up Instructions Return in about 3 months (around 2/28/2018). Your Appointments 3/12/2018 10:30 AM  
ESTABLISHED PATIENT with Corinne Bugler, MD [address] ([facility]) Appt Note: WELLNESS   
 [address] [facility] [address] [facility],5[address] [address] [phone]  
  
   
 [address] [facility] [address] [facility],5[address] [address] Upcoming Health Maintenance Date Due  
 GLAUCOMA SCREENING Q2Y 7/2/1986 OSTEOPOROSIS SCREENING (DEXA) 7/2/1986 MEDICARE YEARLY EXAM 3/7/2018 Pneumococcal 65+ Low/Medium Risk (2 of 2 - PPSV23) 11/29/2018 DTaP/Tdap/Td series (2 - Td) 11/29/2027 Allergies as of 11/29/2017  Review Complete On: 11/29/2017 By: Corinne Bugler, MD  
 No Known Allergies Current Immunizations  Never Reviewed No immunizations on file. Not reviewed this visit You Were Diagnosed With   
  
 Codes Comments Essential hypertension    -  Primary ICD-10-CM: I10 
ICD-9-CM: 401.9 Lumbar spondylosis     ICD-10-CM: M47.816 ICD-9-CM: 721.3 Postmenopausal     ICD-10-CM: Z78.0 ICD-9-CM: V49.81 Stage 3 chronic kidney disease     ICD-10-CM: N18.3 ICD-9-CM: 638. 3 Vitals BP Pulse Temp Height(growth percentile) Weight(growth percentile) SpO2  
 173/69 60 98.5 °F (36.9 °C) (Temporal) 5' 4\" (1.626 m) 164 lb (74.4 kg) 95% BMI OB Status Smoking Status 28.15 kg/m2 Postmenopausal Never Smoker Vitals History BMI and BSA Data Body Mass Index Body Surface Area  
 28.15 kg/m 2 1.83 m 2 Preferred Pharmacy Pharmacy Name Phone [address], [address] AT [facility] OF  [address] [facility] [phone] Your Updated Medication List  
  
   
 This list is accurate as of: 11/29/17 10:45 AM.  Always use your most recent med list. amLODIPine-valsartan  mg per tablet Commonly known as:  Kimberleemary Barajas Take 1 Tab by mouth daily. Indications: pressure  
  
 bisoprolol-hydroCHLOROthiazide 5-6.25 mg per tablet Commonly known as:  [facility] Take 1 Tab by mouth daily. Indications: pressure, replaces carvedilol  
  
 methylPREDNISolone acetate 40 mg/mL injection Commonly known as:  DEPO-MEDROL 1 mL by IntraMUSCular route once for 1 dose. spironolactone 25 mg tablet Commonly known as:  ALDACTONE Take 0.5 Tabs by mouth daily. Indications: pressure TYLENOL 325 mg tablet Generic drug:  acetaminophen Take 325 mg by mouth two (2) times a day. VITAMIN D3 2,000 unit Tab Generic drug:  cholecalciferol (vitamin D3) Take  by mouth. Harolyn Ruby Commonly known as:  ULTRA-LIGHT ROLLATOR  
1 Each by Does Not Apply route daily. Prescriptions Sent to Pharmacy Refills  
 spironolactone (ALDACTONE) 25 mg tablet 6 Sig: Take 0.5 Tabs by mouth daily. Indications: pressure Class: Normal  
 Pharmacy: [facility] [place] [address], [address] [place] [address] [address] Ph #: [phone] Route: Oral  
  
We Performed the Following METABOLIC PANEL, BASIC [[address] CPT(R)] METHYLPREDNISOLONE ACETATE INJECTION 40 MG [ [facility]] [place] THER/PROPH/DIAG INJECTION, SUBCUT/IM [id number] CPT(R)] Follow-up Instructions Return in about 3 months (around 2/28/2018). To-Do List   
 11/29/2017 Imaging:  DEXA BONE DENSITY STUDY AXIAL Patient Instructions We're adding a small pill to help pressure, yours was pretty high today. It's a half pill called spironolactone, and it can be good for the heart and kidney. Take it every morning. If you have any questions regarding mychart, you may call [employer] support at [phone]. Introducing [facility]! Tiffanie Max introduces [employer]yzico patient portal. Now you can access parts of your medical record, email your doctor's office, and request medication refills online. 1. In your internet browser, go to [email or web address] 2. Click on the First Time User? Click Here link in the Sign In box. You will see the New Member Sign Up page. 3. Enter your i[employer]ico Access Code exactly as it appears below. You will not need to use this code after youve completed the sign-up process. If you do not sign up before the expiration date, you must request a new code. · [employer] Access Code: [email or web address] Expires: 2/27/2018 10:45 AM 
 
4. Enter the last four digits of your Social Security Number (xxxx) and Date of Birth (mm/dd/yyyy) as indicated and click Submit. You will be taken to the next sign-up page. 5. Create a iyzico ID. This will be your iyzico login ID and cannot be changed, so think of one that is secure and easy to remember. 6. Create a i[employer]ico password. You can change your password at any time. 7. Enter your Password Reset Question and Answer. This can be used at a later time if you forget your password. 8. Enter your e-mail address. You will receive e-mail notification when new information is available in [address] 9. Click Sign Up. You can now view and download portions of your medical record. 10. Click the Download Summary menu link to download a portable copy of your medical information. If you have questions, please visit the Frequently Asked Questions section of the i[employer] website. Remember, [employer] is NOT to be used for urgent needs. For medical emergencies, dial 911. Now available from your iPhone and Android! Please provide this summary of care documentation to your next provider. Your primary care clinician is listed as Rosaura Haro. If you have any questions after today's visit, please call [phone].

## 2017-11-29 NOTE — PATIENT INSTRUCTIONS
We're adding a small pill to help pressure, yours was pretty high today. It's a half pill called spironolactone, and it can be good for the heart and kidney. Take it every morning. If you have any questions regarding mychart, you may call [employer] support at [phone].

## 2017-11-30 LAB
BUN SERPL-MCNC: 39 MG/DL (ref 10–36)
BUN/CREAT SERPL: 18 (ref 12–28)
CALCIUM SERPL-MCNC: 9.5 MG/DL (ref 8.7–10.3)
CHLORIDE SERPL-SCNC: 103 MMOL/L (ref 96–106)
CO2 SERPL-SCNC: 21 MMOL/L (ref 18–29)
CREAT SERPL-MCNC: 2.19 MG/DL (ref 0.57–1)
GFR SERPLBLD CREATININE-BSD FMLA CKD-EPI: 18 ML/MIN/1.73
GFR SERPLBLD CREATININE-BSD FMLA CKD-EPI: 21 ML/MIN/1.73
GLUCOSE SERPL-MCNC: 100 MG/DL (ref 65–99)
POTASSIUM SERPL-SCNC: 5.8 MMOL/L (ref 3.5–5.2)
SODIUM SERPL-SCNC: 141 MMOL/L (ref 134–144)

## 2017-12-29 ENCOUNTER — TELEPHONE (OUTPATIENT)
Dept: FAMILY MEDICINE CLINIC | Age: 82
End: 2017-12-29

## 2017-12-29 DIAGNOSIS — I10 ESSENTIAL HYPERTENSION: Primary | ICD-10-CM

## 2017-12-29 RX ORDER — SPIRONOLACTONE 25 MG/1
12.5 TABLET ORAL DAILY
Qty: 30 TAB | Refills: 6 | Status: SHIPPED | OUTPATIENT
Start: 2017-12-29 | End: 2018-03-28 | Stop reason: ALTCHOICE

## 2018-02-28 ENCOUNTER — OFFICE VISIT (OUTPATIENT)
Dept: FAMILY MEDICINE CLINIC | Age: 83
End: 2018-02-28

## 2018-02-28 VITALS
HEIGHT: 64 IN | OXYGEN SATURATION: 97 % | HEART RATE: 77 BPM | RESPIRATION RATE: 12 BRPM | BODY MASS INDEX: 28.51 KG/M2 | SYSTOLIC BLOOD PRESSURE: 140 MMHG | TEMPERATURE: 97.7 F | DIASTOLIC BLOOD PRESSURE: 70 MMHG | WEIGHT: 167 LBS

## 2018-02-28 DIAGNOSIS — M19.042 PRIMARY OSTEOARTHRITIS OF BOTH HANDS: ICD-10-CM

## 2018-02-28 DIAGNOSIS — N18.4 CKD (CHRONIC KIDNEY DISEASE) STAGE 4, GFR 15-29 ML/MIN (HCC): ICD-10-CM

## 2018-02-28 DIAGNOSIS — M47.816 LUMBAR SPONDYLOSIS: ICD-10-CM

## 2018-02-28 DIAGNOSIS — I10 ESSENTIAL HYPERTENSION: Primary | ICD-10-CM

## 2018-02-28 DIAGNOSIS — M81.0 POST-MENOPAUSAL OSTEOPOROSIS: ICD-10-CM

## 2018-02-28 DIAGNOSIS — M19.041 PRIMARY OSTEOARTHRITIS OF BOTH HANDS: ICD-10-CM

## 2018-02-28 DIAGNOSIS — Z78.0 POSTMENOPAUSAL: ICD-10-CM

## 2018-02-28 DIAGNOSIS — N18.30 STAGE 3 CHRONIC KIDNEY DISEASE (HCC): ICD-10-CM

## 2018-02-28 RX ORDER — METHYLPREDNISOLONE ACETATE 40 MG/ML
40 INJECTION, SUSPENSION INTRA-ARTICULAR; INTRALESIONAL; INTRAMUSCULAR; SOFT TISSUE ONCE
Qty: 1 VIAL | Refills: 0
Start: 2018-02-28 | End: 2018-02-28

## 2018-02-28 RX ORDER — ALENDRONATE SODIUM 70 MG/1
70 TABLET ORAL
Qty: 4 TAB | Refills: 11 | Status: SHIPPED | OUTPATIENT
Start: 2018-02-28 | End: 2018-09-26 | Stop reason: SDUPTHER

## 2018-02-28 NOTE — MR AVS SNAPSHOT
[address] [facility] 
 
 
 1000 [facility] [address] [facility],5[address] [address] [phone] Patient: Rui Lawrence MRN: [id number] GBN:[id number] Visit Information Date & Time Provider Department Dept. Phone Encounter #  
 2/28/2018 10:10 AM Wolf Perla MD Ashkan Mario Simone [id number] Follow-up Instructions Return in about 4 weeks (around 3/28/2018). Your Appointments 3/12/2018 10:30 AM  
ESTABLISHED PATIENT with MD [address] ([facility]) Appt Note: WELLNESS   
 1000 [address] [address] [phone]  
  
   
 [address] [place] [address],5[address] [address] Upcoming Health Maintenance Date Due  
 GLAUCOMA SCREENING Q2Y 7/2/1986 MEDICARE YEARLY EXAM 3/7/2018 Pneumococcal 65+ Low/Medium Risk (2 of 2 - PPSV23) 11/29/2018 DTaP/Tdap/Td series (2 - Td) 11/29/2027 Allergies as of 2/28/2018  Review Complete On: 2/28/2018 By: Wolf Perla MD  
 No Known Allergies Current Immunizations  Never Reviewed No immunizations on file. Not reviewed this visit You Were Diagnosed With   
  
 Codes Comments Essential hypertension    -  Primary ICD-10-CM: I10 
ICD-9-CM: 401.9 CKD (chronic kidney disease) stage 4, GFR 15-29 ml/min ([facility])     ICD-10-CM: N18.4 ICD-9-CM: 585.4 Postmenopausal     ICD-10-CM: Z78.0 ICD-9-CM: V49.81 Stage 3 chronic kidney disease     ICD-10-CM: N18.3 ICD-9-CM: 068. 3 Lumbar spondylosis     ICD-10-CM: M47.816 ICD-9-CM: 721.3 Primary osteoarthritis of both hands     ICD-10-CM: M19.041, W75.410 ICD-9-CM: 715.14 Post-menopausal osteoporosis     ICD-10-CM: M81.0 ICD-9-CM: 733.01 Vitals BP Pulse Temp Resp Height(growth percentile) Weight(growth percentile) 140/70 (BP 1 Location: Left arm, BP Patient Position: Sitting) 77 97.7 °F (36.5 °C) (Oral) 12 5' 4\" (1.626 m) 167 lb (75.8 kg) SpO2 BMI OB Status Smoking Status 97% 28.67 kg/m2 Postmenopausal Never Smoker BMI and BSA Data Body Mass Index Body Surface Area  
 28.67 kg/m 2 1.85 m 2 Preferred Pharmacy Pharmacy Name Phone [facility], [address] AT [facility] [address] Monteiro [phone] Your Updated Medication List  
  
   
This list is accurate as of 2/28/18 11:04 AM.  Always use your most recent med list.  
  
  
  
  
 alendronate 70 mg tablet Commonly known as:  FOSAMAX Take 1 Tab by mouth every seven (7) days. Indications: brittle bones  
  
 amLODIPine-valsartan  mg per tablet Commonly known as:  Miles Eans Take 1 Tab by mouth daily. Indications: pressure  
  
 methylPREDNISolone acetate 40 mg/mL injection Commonly known as:  DEPO-MEDROL 1 mL by IntraMUSCular route once for 1 dose. spironolactone 25 mg tablet Commonly known as:  ALDACTONE Take 0.5 Tabs by mouth daily. Indications: pressure TYLENOL 325 mg tablet Generic drug:  acetaminophen Take 325 mg by mouth two (2) times a day. VITAMIN D3 2,000 unit Tab Generic drug:  cholecalciferol (vitamin D3) Take  by mouth. Unice [place] Commonly known as:  ULTRA-LIGHT ROLLATOR  
1 Each by Does Not Apply route daily. Prescriptions Sent to Pharmacy Refills  
 alendronate (FOSAMAX) 70 mg tablet 11 Sig: Take 1 Tab by mouth every seven (7) days. Indications: brittle bones Class: Normal  
 Pharmacy: [facility] [address] Ph #: [phone] Route: Oral  
  
We Performed the Following METHYLPREDNISOLONE ACETATE INJECTION 40 MG [ [facility]] NV THER/PROPH/DIAG INJECTION, SUBCUT/IM [id number] CPT(R)] Follow-up Instructions Return in about 4 weeks (around 3/28/2018). Patient Instructions Add a tums EX 1 daily to help bones with calcium. If you have any questions regarding [employer], you may call [employer] support at [phone]. Introducing [facility] SERVICES! Vern Mcintosh introduces [employer] patient portal. Now you can access parts of your medical record, email your doctor's office, and request medication refills online. 1. In your internet browser, go to [email or web address] 2. Click on the First Time User? Click Here link in the Sign In box. You will see the New Member Sign Up page. 3. Enter your [employer] Access Code exactly as it appears below. You will not need to use this code after youve completed the sign-up process. If you do not sign up before the expiration date, you must request a new code. · [employer] Access Code: [email or web address] Expires: 5/29/2018 11:04 AM 
 
4. Enter the last four digits of your Social Security Number (xxxx) and Date of Birth (mm/dd/yyyy) as indicated and click Submit. You will be taken to the next sign-up page. 5. Create a [employer] ID. This will be your [employer] login ID and cannot be changed, so think of one that is secure and easy to remember. 6. Create a [employer] password. You can change your password at any time. 7. Enter your Password Reset Question and Answer. This can be used at a later time if you forget your password. 8. Enter your e-mail address. You will receive e-mail notification when new information is available in [address] 9. Click Sign Up. You can now view and download portions of your medical record. 10. Click the Download Summary menu link to download a portable copy of your medical information. If you have questions, please visit the Frequently Asked Questions section of the [employer] website. Remember, [employer] is NOT to be used for urgent needs. For medical emergencies, dial 911. Now available from your iPhone and Android! Please provide this summary of care documentation to your next provider. Your primary care clinician is listed as Castillo Haro. If you have any questions after today's visit, please call [phone].

## 2018-02-28 NOTE — PROGRESS NOTES
Megan Fox is a 80 y.o. female presenting for/with:    Hypertension    HPI:    HTN  Blood pressures a little worse since last visit. Management at last visit included add spironolactone, and try to con't ziac 5/6.25 daily and exforge 10/320, but seems to havr run out of ziac. Prev elvis well. Current regimen: angiotensin II receptor antagonist , b-blocker, thiazide, and calcium channel blocker. Symptoms include none. Patient denies chest pain, palpitations, dyspnea, orthopnea, peripheral edema, headache. Lab review:   Lab Results   Component Value Date/Time    Sodium 141 11/29/2017 10:47 AM    Potassium 5.8 (H) 11/29/2017 10:47 AM    Chloride 103 11/29/2017 10:47 AM    CO2 21 11/29/2017 10:47 AM    Glucose 100 (H) 11/29/2017 10:47 AM    BUN 39 (H) 11/29/2017 10:47 AM    Creatinine 2.19 (H) 11/29/2017 10:47 AM    BUN/Creatinine ratio 18 11/29/2017 10:47 AM    GFR est AA 21 (L) 11/29/2017 10:47 AM    GFR est non-AA 18 (L) 11/29/2017 10:47 AM    Calcium 9.5 11/29/2017 10:47 AM     Lab Results   Component Value Date/Time    Microalb/Creat ratio (ug/mg creat.) 13.3 10/12/2016 09:24 AM     DJD. Pt has been well controlled with depomedrol 40mg Inj q3mo PRN. Last one 11/2017. Elvis well. Due for anotherone today. Also takes APAP 2 tabs every day PRN, work well, elvis well.     ROS:  Constitutional: No fever, chills or weight loss  Respiratory: No cough, SOB   CV: No chest pain or Palpitations    Exam:  Visit Vitals    /70 (BP 1 Location: Left arm, BP Patient Position: Sitting)    Pulse 77    Temp 97.7 °F (36.5 °C) (Oral)    Resp 12    Ht 5' 4\" (1.626 m)    Wt 167 lb (75.8 kg)    SpO2 97%    BMI 28.67 kg/m2     Wt Readings from Last 3 Encounters:   02/28/18 167 lb (75.8 kg)   11/29/17 164 lb (74.4 kg)   05/26/17 166 lb (75.3 kg)   +3#  BP Readings from Last 3 Encounters:   02/28/18 140/70   11/29/17 173/69   05/26/17 154/71     Physical Examination: General appearance - alert, well appearing, and in no distress  Mental status - alert, oriented to person, place, and time  Eyes - pupils equal and reactive, extraocular eye movements intact  ENT - bilateral external ears and nose normal. Normal lips  Neck - supple, no significant adenopathy. Extremities - peripheral pulses normal, no pedal edema, no clubbing or cyanosis. R shoulder chronic dislocation present, nttp. No ttp to wrists, elbows bilat. A/P:  HTN (hypertension) with CKD4  Better, but has run out of ziac. R/S that. See how she does with that over next mo and plan check BMP next mo    DJD  Time for CS injection. Sx worsening. Give depomedrol 40mg Inj today. Menopausal osteoporosis  Showed on last DXA. T score -2.7 for spine, and FRAX risk was 3.1% hip fx over next 10y. Discussed options, pt ready for tx. Ordered fosamax, con't Vit D, and add Ca++ as tums EX, 1 daily. F/U 1mo.

## 2018-02-28 NOTE — PATIENT INSTRUCTIONS
Add a tums EX 1 daily to help bones with calcium. If you have any questions regarding mychart, you may call [employer] support at [phone].

## 2018-03-28 ENCOUNTER — OFFICE VISIT (OUTPATIENT)
Dept: FAMILY MEDICINE CLINIC | Age: 83
End: 2018-03-28

## 2018-03-28 VITALS
RESPIRATION RATE: 12 BRPM | SYSTOLIC BLOOD PRESSURE: 138 MMHG | HEART RATE: 60 BPM | DIASTOLIC BLOOD PRESSURE: 70 MMHG | TEMPERATURE: 97.7 F | OXYGEN SATURATION: 100 %

## 2018-03-28 DIAGNOSIS — Z13.39 SCREENING FOR ALCOHOLISM: ICD-10-CM

## 2018-03-28 DIAGNOSIS — Z13.31 SCREENING FOR DEPRESSION: ICD-10-CM

## 2018-03-28 DIAGNOSIS — M81.0 POST-MENOPAUSAL OSTEOPOROSIS: ICD-10-CM

## 2018-03-28 DIAGNOSIS — Z00.00 MEDICARE ANNUAL WELLNESS VISIT, SUBSEQUENT: Primary | ICD-10-CM

## 2018-03-28 DIAGNOSIS — I10 ESSENTIAL HYPERTENSION: ICD-10-CM

## 2018-03-28 DIAGNOSIS — N18.4 CKD (CHRONIC KIDNEY DISEASE) STAGE 4, GFR 15-29 ML/MIN (HCC): ICD-10-CM

## 2018-03-28 RX ORDER — BISOPROLOL FUMARATE AND HYDROCHLOROTHIAZIDE 5; 6.25 MG/1; MG/1
TABLET ORAL
Refills: 3 | COMMUNITY
Start: 2018-02-28 | End: 2018-05-24 | Stop reason: SDUPTHER

## 2018-03-28 NOTE — MR AVS SNAPSHOT
Preet العراقي 
 
 
 1000 [facility] [address] [facility],5[address] [address] [phone] Patient: Laquita Mitchell MRN: [id number] Visit Information Date & Time Provider Department Dept. Phone Encounter #  
 3/28/2018 11:10 AM Yuliana Marie MD [address] [place] [address] [id number] Follow-up Instructions Return in about 2 months (around 5/28/2018). Follow-up and Disposition History Your Appointments 5/23/2018 10:30 AM  
[facility] Appt Note: 2mo fu  
 1000 [address] [id number] [phone]  
  
   
 [address] [address] Upcoming Health Maintenance Date Due  
 GLAUCOMA SCREENING Q2Y 7/2/1986 MEDICARE YEARLY EXAM 3/14/2018 Pneumococcal 65+ Low/Medium Risk (2 of 2 - PPSV23) 11/29/2018 DTaP/Tdap/Td series (2 - Td) 11/29/2027 Allergies as of 3/28/2018  Review Complete On: 3/28/2018 By: Yuliana Marie MD  
 No Known Allergies Current Immunizations  Never Reviewed No immunizations on file. Not reviewed this visit You Were Diagnosed With   
  
 Codes Comments Medicare annual wellness visit, subsequent    -  Primary ICD-10-CM: Z00.00 ICD-9-CM: V70.0 Screening for alcoholism     ICD-10-CM: Z13.89 ICD-9-CM: V79.1 Screening for depression     ICD-10-CM: Z13.89 ICD-9-CM: V79.0 Essential hypertension     ICD-10-CM: I10 
ICD-9-CM: 401.9 CKD (chronic kidney disease) stage 4, GFR 15-29 ml/min (HCC)     ICD-10-CM: N18.4 ICD-9-CM: 585.4 Post-menopausal osteoporosis     ICD-10-CM: M81.0 ICD-9-CM: 733.01 Vitals BP Pulse Temp Resp SpO2 OB Status 138/70 (BP 1 Location: Left arm, BP Patient Position: Sitting) 60 97.7 °F (36.5 °C) (Oral) 12 100% Postmenopausal  
 Smoking Status Never Smoker Vitals History Preferred Pharmacy Pharmacy Name Phone [facility], [address] AT [facility] [address] SHRUTHI Altman [phone] Your Updated Medication List  
  
   
This list is accurate as of 3/28/18 12:02 PM.  Always use your most recent med list.  
  
  
  
  
 alendronate 70 mg tablet Commonly known as:  FOSAMAX Take 1 Tab by mouth every seven (7) days. Indications: brittle bones  
  
 amLODIPine-valsartan  mg per tablet Commonly known as:  Diannia Melena Take 1 Tab by mouth daily. Indications: pressure  
  
 bisoprolol-hydroCHLOROthiazide 5-6.25 mg per tablet Commonly known as:  [facility] TAKE 1 TABLET PO DAILY. REPLACES CARVEDILOL  
  
 TYLENOL 325 mg tablet Generic drug:  acetaminophen Take 325 mg by mouth two (2) times a day. VITAMIN D3 2,000 unit Tab Generic drug:  cholecalciferol (vitamin D3) Take  by mouth. Tsephanie [place] Commonly known as:  ULTRA-LIGHT ROLLATOR  
1 Each by Does Not Apply route daily. We Performed the Following COLLECTION VENOUS BLOOD,VENIPUNCTURE [id number] CPT(R)] [facility] 68 [[id number] [facility]] METABOLIC PANEL, BASIC [37619 CPT(R)] CO ANNUAL ALCOHOL SCREEN 15 MIN [id number] [facility]] Follow-up Instructions Return in about 2 months (around 5/28/2018). Patient Instructions Medicare Wellness Visit, Female The best way to live healthy is to have a healthy lifestyle by eating a well-balanced diet, exercising regularly, limiting alcohol and stopping smoking. Regular physical exams and screening tests are another way to keep healthy. Preventive exams provided by your health care provider can find health problems before they become diseases or illnesses. Preventive services including immunizations, screening tests, monitoring and exams can help you take care of your own health. All people over 65 should have a yearly flu shot and a tetanus vaccine every 10 years. Screening for diabetes mellitus with a blood sugar test should be done every year. Glaucoma is a disease of the eye due to increased ocular pressure that can lead to blindness and it should be done every year by an eye professional. 
 
Your Medicare Wellness Exam is recommended annually. Here is a list of your current Health Maintenance items with a due date: 
Health Maintenance Due Topic Date Due  Glaucoma Screening   07/02/1986 Daphne Annual Well Visit  03/14/2018 Patient Instructions History Introducing [facility] HE[facility] SERVICES! Veena Presley introduces [employer] patient portal. Now you can access parts of your medical record, email your doctor's office, and request medication refills online. 1. In your internet browser, go to [email or web address] 2. Click on the First Time User? Click Here link in the Sign In box. You will see the New Member Sign Up page. 3. Enter your [employer] Access Code exactly as it appears below. You will not need to use this code after youve completed the sign-up process. If you do not sign up before the expiration date, you must request a new code. · [employer] Access Code: [email or web address] Expires: 5/29/2018 12:04 PM 
 
4. Enter the last four digits of your Social Security Number (xxxx) and Date of Birth (mm/dd/yyyy) as indicated and click Submit. You will be taken to the next sign-up page. 5. Create a [employer] ID. This will be your [employer] login ID and cannot be changed, so think of one that is secure and easy to remember. 6. Create a [employer] password. You can change your password at any time. 7. Enter your Password Reset Question and Answer. This can be used at a later time if you forget your password. 8. Enter your e-mail address. You will receive e-mail notification when new information is available in [address] 9. Click Sign Up. You can now view and download portions of your medical record. 10. Click the Download Summary menu link to download a portable copy of your medical information. If you have questions, please visit the Frequently Asked Questions section of the [employer] website. Remember, [employer] is NOT to be used for urgent needs. For medical emergencies, dial 911. Now available from your iPhone and Android! Please provide this summary of care documentation to your next provider. Your primary care clinician is listed as Castillo Haro. If you have any questions after today's visit, please call [phone].

## 2018-03-28 NOTE — PROGRESS NOTES
Delford Litten is a 80 y.o. female presenting for/with: Annual Wellness Visit    HPI:    HTN  Blood pressures a little better since last visit. Management at last visit included con't current tx, had run out of ziac 5/6.25. Remains on that, spironolactone, and exforge 10/320. Elvis well. Current regimen: angiotensin II receptor antagonist , b-blocker, thiazide, and calcium channel blocker. Symptoms include none. Patient denies chest pain, palpitations, dyspnea, orthopnea, peripheral edema, headache. Lab review:   Lab Results   Component Value Date/Time    Sodium 141 11/29/2017 10:47 AM    Potassium 5.8 (H) 11/29/2017 10:47 AM    Chloride 103 11/29/2017 10:47 AM    CO2 21 11/29/2017 10:47 AM    Glucose 100 (H) 11/29/2017 10:47 AM    BUN 39 (H) 11/29/2017 10:47 AM    Creatinine 2.19 (H) 11/29/2017 10:47 AM    BUN/Creatinine ratio 18 11/29/2017 10:47 AM    GFR est AA 21 (L) 11/29/2017 10:47 AM    GFR est non-AA 18 (L) 11/29/2017 10:47 AM    Calcium 9.5 11/29/2017 10:47 AM     Lab Results   Component Value Date/Time    Microalb/Creat ratio (ug/mg creat.) 13.3 10/12/2016 09:24 AM     DJD. Pt has been well controlled with depomedrol 40mg Inj q3mo PRN. Last one 2/2018. Elvis well. Due for another one later this spring. Also takes APAP 2 tabs every day PRN, work well, elvis well.     ROS:  Constitutional: No fever, chills or weight loss  Respiratory: No cough, SOB   CV: No chest pain or Palpitations    Exam:  Visit Vitals    /70 (BP 1 Location: Left arm, BP Patient Position: Sitting)    Pulse 60    Temp 97.7 °F (36.5 °C) (Oral)    Resp 12    SpO2 100%     Wt Readings from Last 3 Encounters:   02/28/18 167 lb (75.8 kg)   11/29/17 164 lb (74.4 kg)   05/26/17 166 lb (75.3 kg)     BP Readings from Last 3 Encounters:   03/28/18 138/70   02/28/18 140/70   11/29/17 173/69     Physical Examination: General appearance - alert, well appearing, and in no distress  Mental status - alert, oriented to person, place, and time  Eyes - pupils equal and reactive, extraocular eye movements intact  ENT - bilateral external ears and nose normal. Normal lips  Neck - supple, no significant adenopathy. Extremities - peripheral pulses normal, no pedal edema, no clubbing or cyanosis. R shoulder chronic dislocation present, nttp. No ttp to wrists, elbows bilat. A/P:  HTN (hypertension) with CKD4  Better. See how labs looking on current regimen. check BMP. DJD  Plan rpt CS injection at May visit if sx worsening. (depomedrol 40mg Inj). Menopausal osteoporosis  Showed on last DXA. T score -2.7 for spine, and FRAX risk was 3.1% hip fx over next 10y. Doing well on fosamax, con't. F/U 2mo/PRN    ______________________________________________________________________    Daphnie Weeks is a 80 y.o. female and presents for annual Medicare Wellness Visit. Problem List: Reviewed with patient and discussed risk factors. Patient Active Problem List   Diagnosis Code    HTN (hypertension) I10    CKD (chronic kidney disease) stage 4, GFR 15-29 ml/min (HCC) N18.4    Lumbar spondylosis M47.816    Post-menopausal osteoporosis M81.0       1. Have you been to the ER, urgent care clinic since your last visit? Hospitalized since your last visit? No    2. Have you seen or consulted any other health care providers outside of the [address] [place] [address] since your last visit? Include any pap smears or colon screening. No    Current medical providers:  Patient Care Team:  Adri Fisher MD as PCP - General (Family Practice)    PMH, , Medications/Allergies: reviewed, on chart. Female Alcohol Screening: On any occasion during the past 3 months, have you had more than 3 drinks containing alcohol? No    Do you average more than 7 drinks per week?   No      ROS:  Constitutional: No fever, chills or weight loss  Respiratory: No cough, SOB   CV: No chest pain or Palpitations    Objective:  Visit Vitals    /70 (BP 1 Location: Left arm, BP Patient Position: Sitting)    Pulse 60    Temp 97.7 °F (36.5 °C) (Oral)    Resp 12    SpO2 100%    There is no height or weight on file to calculate BMI. Assessment of cognitive impairment: Alert and oriented x 3      Depression Screen:   PHQ over the last two weeks 3/28/2018   PHQ Not Done -   Little interest or pleasure in doing things Not at all   Feeling down, depressed or hopeless Not at all   Total Score PHQ 2 0       Fall Risk Assessment:    Fall Risk Assessment, last 12 mths 3/28/2018   Able to walk? Yes   Fall in past 12 months? No   Fall with injury? -   Number of falls in past 12 months -   Fall Risk Score -       Functional Ability:   Does the patient exhibit a steady gait?  no   How long did it take the patient to get up and walk from a sitting position? 3   Is the patient self reliant?  (ie can do own laundry, meals, household chores)  yes     Does the patient handle his/her own medications? yes     Does the patient handle his/her own money? yes     Is the patients home safe (ie good lighting, handrails on stairs and bath, etc.)? no     Did you notice or did patient express any hearing difficulties? yes     Did you notice or did patient express any vision difficulties? yes       Advance Care Planning:   Patient was offered the opportunity to discuss advance care planning:  yes     Does patient have an Advance Directive:  yes   If no, did you provide information on Caring Connections?   yes       Plan:     Orders Placed This Encounter    Depression Screen Annual    Annual  Alcohol Screen 15 min ()    bisoprolol-hydroCHLOROthiazide (ZIAC) 5-6.25 mg per tablet       Health Maintenance   Topic Date Due    GLAUCOMA SCREENING Q2Y  07/02/1986    MEDICARE YEARLY EXAM  03/14/2018    Pneumococcal 65+ Low/Medium Risk (2 of 2 - PPSV23) 11/29/2018    DTaP/Tdap/Td series (2 - Td) 11/29/2027    Bone Densitometry (Dexa) Screening  Completed    ZOSTER VACCINE AGE 60>  Addressed   [facility] Influenza Age 5 to Adult  Addressed       *Patient verbalized understanding and agreement with the plan. A copy of the After Visit Summary with personalized health plan was given to the patient today.

## 2018-03-28 NOTE — PATIENT INSTRUCTIONS
Medicare Wellness Visit, Female    The best way to live healthy is to have a healthy lifestyle by eating a well-balanced diet, exercising regularly, limiting alcohol and stopping smoking. Regular physical exams and screening tests are another way to keep healthy. Preventive exams provided by your health care provider can find health problems before they become diseases or illnesses. Preventive services including immunizations, screening tests, monitoring and exams can help you take care of your own health. All people over 65 should have a yearly flu shot and a tetanus vaccine every 10 years. Screening for diabetes mellitus with a blood sugar test should be done every year. Glaucoma is a disease of the eye due to increased ocular pressure that can lead to blindness and it should be done every year by an eye professional.    Your Medicare Wellness Exam is recommended annually.     Here is a list of your current Health Maintenance items with a due date:  Health Maintenance Due   Topic Date Due    Glaucoma Screening   07/02/1986    Annual Well Visit  03/14/2018

## 2018-03-29 LAB
BUN SERPL-MCNC: 28 MG/DL (ref 10–36)
BUN/CREAT SERPL: 17 (ref 12–28)
CALCIUM SERPL-MCNC: 8.9 MG/DL (ref 8.7–10.3)
CHLORIDE SERPL-SCNC: 109 MMOL/L (ref 96–106)
CO2 SERPL-SCNC: 22 MMOL/L (ref 18–29)
CREAT SERPL-MCNC: 1.66 MG/DL (ref 0.57–1)
GFR SERPLBLD CREATININE-BSD FMLA CKD-EPI: 26 ML/MIN/1.73
GFR SERPLBLD CREATININE-BSD FMLA CKD-EPI: 30 ML/MIN/1.73
GLUCOSE SERPL-MCNC: 92 MG/DL (ref 65–99)
POTASSIUM SERPL-SCNC: 4.9 MMOL/L (ref 3.5–5.2)
SODIUM SERPL-SCNC: 145 MMOL/L (ref 134–144)

## 2018-05-23 ENCOUNTER — OFFICE VISIT (OUTPATIENT)
Dept: FAMILY MEDICINE CLINIC | Age: 83
End: 2018-05-23

## 2018-05-23 VITALS
RESPIRATION RATE: 16 BRPM | TEMPERATURE: 97.8 F | DIASTOLIC BLOOD PRESSURE: 56 MMHG | SYSTOLIC BLOOD PRESSURE: 122 MMHG | HEIGHT: 64 IN | HEART RATE: 52 BPM | BODY MASS INDEX: 26.98 KG/M2 | WEIGHT: 158 LBS | OXYGEN SATURATION: 97 %

## 2018-05-23 DIAGNOSIS — M47.816 LUMBAR SPONDYLOSIS: ICD-10-CM

## 2018-05-23 DIAGNOSIS — N18.4 CKD (CHRONIC KIDNEY DISEASE) STAGE 4, GFR 15-29 ML/MIN (HCC): ICD-10-CM

## 2018-05-23 DIAGNOSIS — I10 ESSENTIAL HYPERTENSION: Primary | ICD-10-CM

## 2018-05-23 RX ORDER — AMLODIPINE AND VALSARTAN 10; 320 MG/1; MG/1
1 TABLET ORAL DAILY
Qty: 90 TAB | Refills: 3 | Status: SHIPPED | OUTPATIENT
Start: 2018-05-23 | End: 2019-07-09 | Stop reason: SDUPTHER

## 2018-05-23 RX ORDER — SPIRONOLACTONE 25 MG/1
TABLET ORAL DAILY
COMMUNITY
End: 2018-05-23 | Stop reason: SDUPTHER

## 2018-05-23 RX ORDER — METHYLPREDNISOLONE ACETATE 40 MG/ML
40 INJECTION, SUSPENSION INTRA-ARTICULAR; INTRALESIONAL; INTRAMUSCULAR; SOFT TISSUE ONCE
Qty: 1 VIAL | Refills: 0
Start: 2018-05-23 | End: 2018-05-23

## 2018-05-23 RX ORDER — SPIRONOLACTONE 25 MG/1
12.5 TABLET ORAL DAILY
Qty: 30 TAB | Refills: 5
Start: 2018-05-23 | End: 2018-09-26 | Stop reason: SDUPTHER

## 2018-05-23 NOTE — PROGRESS NOTES
1. Have you been to the ER, urgent care clinic since your last visit? Hospitalized since your last visit? No    2. Have you seen or consulted any other health care providers outside of the [address] [place] [address] since your last visit? Include any pap smears or colon screening.  No

## 2018-05-23 NOTE — PROGRESS NOTES
Patrizia Adrian is a 80 y.o. female presenting for/with:    HTN, DJD    HPI:    HTN  Blood pressures great. Management at last visit included con't current tx. Current meds include ziac 5/6.25mg daily, spironolactone 12.5mg daily, and exforge 10/320mg daily. Elvis well. Symptoms include none. Patient denies chest pain, palpitations, dyspnea, orthopnea, peripheral edema, headache. Lab review:   Lab Results   Component Value Date/Time    Sodium 145 (H) 03/28/2018 11:51 AM    Potassium 4.9 03/28/2018 11:51 AM    Chloride 109 (H) 03/28/2018 11:51 AM    CO2 22 03/28/2018 11:51 AM    Glucose 92 03/28/2018 11:51 AM    BUN 28 03/28/2018 11:51 AM    Creatinine 1.66 (H) 03/28/2018 11:51 AM    BUN/Creatinine ratio 17 03/28/2018 11:51 AM    GFR est AA 30 (L) 03/28/2018 11:51 AM    GFR est non-AA 26 (L) 03/28/2018 11:51 AM    Calcium 8.9 03/28/2018 11:51 AM     Lab Results   Component Value Date/Time    Microalb/Creat ratio (ug/mg creat.) 13.3 10/12/2016 09:24 AM     DJD. Pt has been well controlled with depomedrol 40mg Inj q3mo PRN. Last one 2/2018. Elvis well. Due today. Also takes APAP 2 tabs every day PRN, work well, elvis well.     ROS:  Constitutional: No fever, chills or weight loss  Respiratory: No cough, SOB   CV: No chest pain or Palpitations    Exam:  Visit Vitals    /56 (BP 1 Location: Left arm, BP Patient Position: Sitting)    Pulse (!) 52    Temp 97.8 °F (36.6 °C) (Oral)    Resp 16    Ht 5' 4\" (1.626 m)    Wt 158 lb (71.7 kg)    SpO2 97%    BMI 27.12 kg/m2     Wt Readings from Last 3 Encounters:   05/23/18 158 lb (71.7 kg)   02/28/18 167 lb (75.8 kg)   11/29/17 164 lb (74.4 kg)     BP Readings from Last 3 Encounters:   05/23/18 122/56   03/28/18 138/70   02/28/18 140/70     Physical Examination: General appearance - alert, well appearing, and in no distress  Mental status - alert, oriented to person, place, and time  Eyes - pupils equal and reactive, extraocular eye movements intact  ENT - bilateral external ears and nose normal. Normal lips  Neck - supple, no significant adenopathy. Extremities - peripheral pulses normal, no pedal edema, no clubbing or cyanosis. R shoulder chronic dislocation present, nttp. No ttp to wrists, elbows bilat. A/P:  HTN (hypertension) with CKD4  Better. See how labs looking on current regimen. check BMP. DJD  Rpt depomedrol 40mg Inj today. Consider another CS injection at Aug visit if sx worsening. (depomedrol 40mg Inj). Menopausal osteoporosis  Showed on last DXA. T score -2.7 for spine, and FRAX risk was 3.1% hip fx over next 10y. Doing well on fosamax, con't.      F/U 3mo/PRN

## 2018-05-23 NOTE — MR AVS SNAPSHOT
Rigo Walton 
 
 
 [address] [facility] [phone] Patient: Ian Vallejo MRN: [id number] Visit Information Date & Time Provider Department Dept. Phone Encounter #  
 5/23/2018 10:30 AM Yuliana Dunne MD Zakia Nichols [id number] Upcoming Health Maintenance Date Due  
 GLAUCOMA SCREENING Q2Y 7/2/1986 Influenza Age 5 to Adult 8/1/2018 Pneumococcal 65+ Low/Medium Risk (2 of 2 - PPSV23) 11/29/2018 MEDICARE YEARLY EXAM 3/29/2019 DTaP/Tdap/Td series (2 - Td) 11/29/2027 Allergies as of 5/23/2018  Review Complete On: 5/23/2018 By: Yuliana Dunne MD  
 No Known Allergies Current Immunizations  Never Reviewed No immunizations on file. Not reviewed this visit You Were Diagnosed With   
  
 Codes Comments Essential hypertension    -  Primary ICD-10-CM: I10 
ICD-9-CM: 401.9 Lumbar spondylosis     ICD-10-CM: M47.816 ICD-9-CM: 721.3 CKD (chronic kidney disease) stage 4, GFR 15-29 ml/min (HCC)     ICD-10-CM: N18.4 ICD-9-CM: 896. 4 Vitals BP Pulse Temp Resp Height(growth percentile) Weight(growth percentile) 122/56 (BP 1 Location: Left arm, BP Patient Position: Sitting) (!) 52 97.8 °F (36.6 °C) (Oral) 16 5' 4\" (1.626 m) 158 lb (71.7 kg) SpO2 BMI OB Status Smoking Status 97% 27.12 kg/m2 Postmenopausal Never Smoker BMI and BSA Data Body Mass Index Body Surface Area  
 27.12 kg/m 2 1.8 m 2 Preferred Pharmacy Pharmacy Name Phone [address], [address] AT [facility]  [address] [facility] Iona Osler [phone] Your Updated Medication List  
  
   
This list is accurate as of 5/23/18 11:32 AM.  Always use your most recent med list.  
  
  
  
  
 alendronate 70 mg tablet Commonly known as:  FOSAMAX Take 1 Tab by mouth every seven (7) days. Indications: brittle bones amLODIPine-valsartan  mg per tablet Commonly known as:  Chuck Martinez Take 1 Tab by mouth daily. Indications: pressure  
  
 bisoprolol-hydroCHLOROthiazide 5-6.25 mg per tablet Commonly known as:  [facility] TA[facility] 1 TABLET PO DAILY. REPLACES CARVEDILOL  
  
 methylPREDNISolone acetate 40 mg/mL injection Commonly known as:  DEPO-MEDROL 1 mL by IntraMUSCular route once for 1 dose. spironolactone 25 mg tablet Commonly known as:  ALDACTONE Take 0.5 Tabs by mouth daily. Indications: pressure TYLENOL 325 mg tablet Generic drug:  acetaminophen Take 325 mg by mouth two (2) times a day. VITAMIN D3 2,000 unit Tab Generic drug:  cholecalciferol (vitamin D3) Take  by mouth. Bruce Hammans Commonly known as:  ULTRA-LIGHT ROLLATOR  
1 Each by Does Not Apply route daily. Prescriptions Sent to Pharmacy Refills  
 amLODIPine-valsartan (EXFORGE)  mg per tablet 3 Sig: Take 1 Tab by mouth daily. Indications: pressure Class: Normal  
 Pharmacy: [facility], [address] [facility] #: [phone] Route: Oral  
  
We Performed the Following CBC WITH AUTOMATED DIFF [99714 CPT(R)] METABOLIC PANEL, BASIC [48714 CPT(R)] METHYLPREDNISOLONE ACETATE INJECTION 40 MG [ HCPCS] VA THER/PROPH/DIAG INJECTION, SUBCUT/IM [id number] CPT(R)] Introducing [facility]! [facility] introduces [employer] patient portal. Now you can access parts of your medical record, email your doctor's office, and request medication refills online. 1. In your internet browser, go to [email or web address] 2. Click on the First Time User? Click Here link in the Sign In box. You will see the New Member Sign Up page. 3. Enter your [employer] Access Code exactly as it appears below. You will not need to use this code after youve completed the sign-up process.  If you do not sign up before the expiration date, you must request a new code. · [employer] Access Code: [email or web address] Expires: 5/29/2018 12:04 PM 
 
4. Enter the last four digits of your Social Security Number (xxxx) and Date of Birth (mm/dd/yyyy) as indicated and click Submit. You will be taken to the next sign-up page. 5. Create a [employer] ID. This will be your [employer] login ID and cannot be changed, so think of one that is secure and easy to remember. 6. Create a [employer] password. You can change your password at any time. 7. Enter your Password Reset Question and Answer. This can be used at a later time if you forget your password. 8. Enter your e-mail address. You will receive e-mail notification when new information is available in [address]. 9. Click Sign Up. You can now view and download portions of your medical record. 10. Click the Download Summary menu link to download a portable copy of your medical information. If you have questions, please visit the Frequently Asked Questions section of the [employer] website. Remember, [employer] is NOT to be used for urgent needs. For medical emergencies, dial 911. Now available from your iPhone and Android! Please provide this summary of care documentation to your next provider. Your primary care clinician is listed as Chirag Haro. If you have any questions after today's visit, please call [phone].

## 2018-05-24 ENCOUNTER — TELEPHONE (OUTPATIENT)
Dept: FAMILY MEDICINE CLINIC | Age: 83
End: 2018-05-24

## 2018-05-24 LAB
BASOPHILS # BLD AUTO: 0 X10E3/UL (ref 0–0.2)
BASOPHILS NFR BLD AUTO: 0 %
BUN SERPL-MCNC: 51 MG/DL (ref 10–36)
BUN/CREAT SERPL: 23 (ref 12–28)
CALCIUM SERPL-MCNC: 9.3 MG/DL (ref 8.7–10.3)
CHLORIDE SERPL-SCNC: 111 MMOL/L (ref 96–106)
CO2 SERPL-SCNC: 15 MMOL/L (ref 18–29)
CREAT SERPL-MCNC: 2.21 MG/DL (ref 0.57–1)
EOSINOPHIL # BLD AUTO: 0 X10E3/UL (ref 0–0.4)
EOSINOPHIL NFR BLD AUTO: 1 %
ERYTHROCYTE [DISTWIDTH] IN BLOOD BY AUTOMATED COUNT: 12.9 % (ref 12.3–15.4)
GFR SERPLBLD CREATININE-BSD FMLA CKD-EPI: 18 ML/MIN/1.73
GFR SERPLBLD CREATININE-BSD FMLA CKD-EPI: 21 ML/MIN/1.73
GLUCOSE SERPL-MCNC: 107 MG/DL (ref 65–99)
HCT VFR BLD AUTO: 31.3 % (ref 34–46.6)
HGB BLD-MCNC: 10 G/DL (ref 11.1–15.9)
IMM GRANULOCYTES # BLD: 0 X10E3/UL (ref 0–0.1)
IMM GRANULOCYTES NFR BLD: 0 %
LYMPHOCYTES # BLD AUTO: 1.7 X10E3/UL (ref 0.7–3.1)
LYMPHOCYTES NFR BLD AUTO: 47 %
MCH RBC QN AUTO: 34.8 PG (ref 26.6–33)
MCHC RBC AUTO-ENTMCNC: 31.9 G/DL (ref 31.5–35.7)
MCV RBC AUTO: 109 FL (ref 79–97)
MONOCYTES # BLD AUTO: 0.2 X10E3/UL (ref 0.1–0.9)
MONOCYTES NFR BLD AUTO: 6 %
NEUTROPHILS # BLD AUTO: 1.7 X10E3/UL (ref 1.4–7)
NEUTROPHILS NFR BLD AUTO: 46 %
PLATELET # BLD AUTO: 227 X10E3/UL (ref 150–379)
POTASSIUM SERPL-SCNC: 5.8 MMOL/L (ref 3.5–5.2)
RBC # BLD AUTO: 2.87 X10E6/UL (ref 3.77–5.28)
SODIUM SERPL-SCNC: 138 MMOL/L (ref 134–144)
WBC # BLD AUTO: 3.7 X10E3/UL (ref 3.4–10.8)

## 2018-05-24 RX ORDER — BISOPROLOL FUMARATE AND HYDROCHLOROTHIAZIDE 5; 6.25 MG/1; MG/1
TABLET ORAL
Qty: 45 TAB | Refills: 1
Start: 2018-05-24 | End: 2018-05-29 | Stop reason: SDUPTHER

## 2018-05-24 NOTE — PROGRESS NOTES
Blood count fine, kidneys weaker. Try cutting bisoprolol/HCT to half tab daily. Rest of meds stay the same. Please inform pt.

## 2018-05-25 ENCOUNTER — TELEPHONE (OUTPATIENT)
Dept: FAMILY MEDICINE CLINIC | Age: 83
End: 2018-05-25

## 2018-06-13 ENCOUNTER — TELEPHONE (OUTPATIENT)
Dept: FAMILY MEDICINE CLINIC | Age: 83
End: 2018-06-13

## 2018-06-13 NOTE — TELEPHONE ENCOUNTER
----- Message from [facility] sent at 6/13/2018  2:28 PM EDT -----  Regarding: Dr. Leisa Bunch, from [place] [phone], wants clarification on \"Alendronate 79 mg\" hasn't filled since march and not sure if she should be taking this?

## 2018-06-13 NOTE — TELEPHONE ENCOUNTER
----- Message from [facility] sent at 6/13/2018  2:28 PM EDT -----  Regarding: Dr. Juno Rodriguez, from [place] [phone], wants clarification on \"Alendronate 79 mg\" hasn't filled since march and not sure if she should be taking this?

## 2020-06-24 ENCOUNTER — TELEPHONE (OUTPATIENT)
Dept: PRIMARY CARE CLINIC | Age: 85
End: 2020-06-24

## 2020-06-24 NOTE — TELEPHONE ENCOUNTER
----- Message from Ira Randy sent at 6/24/2020 11:41 AM EDT ----- Regarding: /telephone Medication Refill Caller (if not patient): 
 
 
Relationship of caller (if not patient): 
 
 
Best contact number(s):[phone] Name of medication and dosage if known: Amlodopine, 10 mg,spironalocatine 25 mg, omeprazole 20 mg. Is patient out of this medication (yes/no): yes Pharmacy name: [facility]. Pharmacy listed in chart? (yes/no):no, pt has moved. Pharmacy phone number: